# Patient Record
Sex: FEMALE | Race: WHITE | NOT HISPANIC OR LATINO | URBAN - METROPOLITAN AREA
[De-identification: names, ages, dates, MRNs, and addresses within clinical notes are randomized per-mention and may not be internally consistent; named-entity substitution may affect disease eponyms.]

---

## 2017-06-05 ENCOUNTER — EMERGENCY (EMERGENCY)
Facility: HOSPITAL | Age: 82
LOS: 1 days | Discharge: HOME | End: 2017-06-05
Attending: EMERGENCY MEDICINE | Admitting: EMERGENCY MEDICINE
Payer: MEDICARE

## 2017-06-05 VITALS
WEIGHT: 119.93 LBS | SYSTOLIC BLOOD PRESSURE: 162 MMHG | TEMPERATURE: 98 F | DIASTOLIC BLOOD PRESSURE: 83 MMHG | OXYGEN SATURATION: 96 % | HEIGHT: 60 IN | RESPIRATION RATE: 18 BRPM | HEART RATE: 82 BPM

## 2017-06-05 DIAGNOSIS — T78.3XXA ANGIONEUROTIC EDEMA, INITIAL ENCOUNTER: ICD-10-CM

## 2017-06-05 DIAGNOSIS — Z88.0 ALLERGY STATUS TO PENICILLIN: ICD-10-CM

## 2017-06-05 DIAGNOSIS — R22.0 LOCALIZED SWELLING, MASS AND LUMP, HEAD: ICD-10-CM

## 2017-06-05 DIAGNOSIS — Z88.8 ALLERGY STATUS TO OTHER DRUGS, MEDICAMENTS AND BIOLOGICAL SUBSTANCES STATUS: ICD-10-CM

## 2017-06-05 PROCEDURE — 99283 EMERGENCY DEPT VISIT LOW MDM: CPT

## 2017-06-05 NOTE — ED ADULT TRIAGE NOTE - CHIEF COMPLAINT QUOTE
walked in c/o facial and lip swelling since 8pm. Pt. reports she had fillers done at the dermatologist office today, cause a small irritation to the corner of lip and then went to have dinner and was eating spicy food and noticed the swelling. No resp. compromise. took 2 Claritin and 3 benadryl pta.

## 2017-06-05 NOTE — ED ADULT NURSE NOTE - OBJECTIVE STATEMENT
allergic reaction; had lip fillers yesterday now having reaction to injection site; upper lip inflammation and swelling; now spreading to cheek; able swallow and control secretions; speaking in complete sentences; denies SOB or difficulty breathing

## 2017-06-06 VITALS
SYSTOLIC BLOOD PRESSURE: 176 MMHG | HEART RATE: 69 BPM | TEMPERATURE: 98 F | DIASTOLIC BLOOD PRESSURE: 82 MMHG | OXYGEN SATURATION: 99 % | RESPIRATION RATE: 18 BRPM

## 2017-06-06 RX ORDER — DIPHENHYDRAMINE HCL 50 MG
50 CAPSULE ORAL ONCE
Qty: 0 | Refills: 0 | Status: COMPLETED | OUTPATIENT
Start: 2017-06-06 | End: 2017-06-06

## 2017-06-06 RX ADMIN — Medication 50 MILLIGRAM(S): at 01:50

## 2017-06-06 NOTE — ED PROVIDER NOTE - ATTENDING CONTRIBUTION TO CARE
allergic reaction with swelling of R upper and lower lips, no airway compromise, no distress, improving after benadryl and observation, unable to take steroids, wants to go home, reliable, will discharge with return precautions, instructions to continue PO benadryl, and follow up with PMD tomorrow

## 2017-06-06 NOTE — ED PROVIDER NOTE - OBJECTIVE STATEMENT
PMHx liver mass on gleevex presents with swelling over the R corner of the mouth that started at 7:30p after eating scallops with chilli peppers. swelling started to spreat extends to the R side of the lip and cheek. she took two claritin and 3 benadryl 25mg prior to arrival. denies wheezing, chest pain, throat swelling, tongue swell, rash. denies history of similar symptoms. patient states that she had resalin injects around the mouth and to the area where the swelling developed at 4:30p this afternoon. patient has had injections before without ill effect

## 2017-06-06 NOTE — ED ADULT NURSE REASSESSMENT NOTE - NS ED NURSE REASSESS COMMENT FT1
swelling to upper lip staying the same; no worsening of inflammation; still speaking in complete sentences; no difficulty breathing

## 2017-06-06 NOTE — ED PROVIDER NOTE - PROGRESS NOTE DETAILS
patient offered steroids and IV benadryl but is refusing. states that she was told to avoid steroids by her oncologist. patient with continued improvement of edema. CTA BL, airway patient

## 2017-06-06 NOTE — ED PROVIDER NOTE - MEDICAL DECISION MAKING DETAILS
patient with angioedema after eating scallops with chilli. feeling better after PO benadryl. advised continued use of benadryl and follow up with PCP and allergist for testing.

## 2017-06-28 ENCOUNTER — EMERGENCY (EMERGENCY)
Facility: HOSPITAL | Age: 82
LOS: 1 days | Discharge: HOME | End: 2017-06-28
Attending: EMERGENCY MEDICINE | Admitting: EMERGENCY MEDICINE
Payer: MEDICARE

## 2017-06-28 VITALS
HEART RATE: 82 BPM | DIASTOLIC BLOOD PRESSURE: 77 MMHG | RESPIRATION RATE: 18 BRPM | TEMPERATURE: 98 F | SYSTOLIC BLOOD PRESSURE: 135 MMHG | OXYGEN SATURATION: 98 %

## 2017-06-28 DIAGNOSIS — S92.502D: ICD-10-CM

## 2017-06-28 DIAGNOSIS — Z88.0 ALLERGY STATUS TO PENICILLIN: ICD-10-CM

## 2017-06-28 DIAGNOSIS — Z88.8 ALLERGY STATUS TO OTHER DRUGS, MEDICAMENTS AND BIOLOGICAL SUBSTANCES STATUS: ICD-10-CM

## 2017-06-28 PROCEDURE — 99282 EMERGENCY DEPT VISIT SF MDM: CPT

## 2017-06-28 NOTE — ED ADULT NURSE NOTE - CHPI ED SYMPTOMS NEG
no numbness/no weakness/no bruising/no back pain/no difficulty bearing weight/no deformity/no tingling/no fever/no abrasion/no stiffness

## 2017-06-28 NOTE — ED PROVIDER NOTE - MUSCULOSKELETAL, MLM
Some tenderness to palpation and mild swelling to the left 3rd toe Some tenderness to palpation and mild swelling to the left 3rd toe at PIP

## 2017-06-28 NOTE — ED PROVIDER NOTE - MEDICAL DECISION MAKING DETAILS
Likely fx of left 3rd toe, pt does not want xray and I agree and is not indicated and will . Instructed buddy tape, hard toe shoe, F/U with PMD, and NSAID as needed.

## 2017-06-28 NOTE — ED PROVIDER NOTE - OBJECTIVE STATEMENT
85 y/o Female presents to the ED from PCP for left 3rd toe evaluation. Pt states she stubbed her toe on the foot of her bed 2 months ago, but her PCP sent her to the ED for images. Pt endorses some pain and swelling but is able to bear weight with normal gait.

## 2017-06-28 NOTE — ED ADULT NURSE NOTE - OBJECTIVE STATEMENT
Pt sent in by PMD for xray of left third toe after stumping it [id number] ago. Pt reports pain at site, some edema present, no bruising or deformities noted. Pt denies any numbness or tingling. Pt does not having difficulty bearing weight, can ambulate.

## 2019-02-25 ENCOUNTER — EMERGENCY (EMERGENCY)
Facility: HOSPITAL | Age: 84
LOS: 1 days | Discharge: HOME | End: 2019-02-25
Admitting: EMERGENCY MEDICINE
Payer: MEDICARE

## 2019-02-25 VITALS
WEIGHT: 106.92 LBS | SYSTOLIC BLOOD PRESSURE: 137 MMHG | RESPIRATION RATE: 18 BRPM | DIASTOLIC BLOOD PRESSURE: 78 MMHG | HEART RATE: 87 BPM | OXYGEN SATURATION: 95 % | TEMPERATURE: 98 F

## 2019-02-25 DIAGNOSIS — M79.671 PAIN IN RIGHT FOOT: ICD-10-CM
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
## 2019-02-25 DIAGNOSIS — Z88.0 ALLERGY STATUS TO PENICILLIN: ICD-10-CM

## 2019-02-25 DIAGNOSIS — Z88.8 ALLERGY STATUS TO OTHER DRUGS, MEDICAMENTS AND BIOLOGICAL SUBSTANCES: ICD-10-CM

## 2019-02-25 DIAGNOSIS — L03.031 CELLULITIS OF RIGHT TOE: ICD-10-CM

## 2019-02-25 DIAGNOSIS — S90.851A SUPERFICIAL FOREIGN BODY, RIGHT FOOT, INITIAL ENCOUNTER: ICD-10-CM
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
## 2019-02-25 PROCEDURE — 99283 EMERGENCY DEPT VISIT LOW MDM: CPT | Mod: 25

## 2019-02-25 PROCEDURE — 28190 REMOVAL OF FOOT FOREIGN BODY: CPT

## 2019-02-25 NOTE — ED ADULT TRIAGE NOTE - CHIEF COMPLAINT QUOTE
c/o right foot pain x 2 days. as per pt, does not know if she has a cut or splinter after stepping on wooden floor.

## 2019-02-25 NOTE — ED PROCEDURE NOTE - NS ED ATTENDING NAME FT
ling
Patient has abnormal UA, and has history of chronic UTIs, with evidence of bladder wall thickening on CT which may represent cystitis.   Patient takes nitrofurantoin 50mg PO qd at home for prophylaxis.  Will hold nitrofurantoin for now while patient is receiving zosyn.

## 2019-02-25 NOTE — ED PROVIDER NOTE - CARE PLAN
Principal Discharge DX:	Splinter in skin  Secondary Diagnosis:	Cellulitis of toe, unspecified laterality

## 2019-02-25 NOTE — ED PROVIDER NOTE - OBJECTIVE STATEMENT
88 y/o female with no pertinent PMHx, allergic to Celebrex and penicillin, presents to ED with c/o right foot pain x2 days. Pt states she thinks she may have a splinter after walking on a wooden floor. She denies weakness, no numbness/tingling, no other injury or complaints. 88 y/o female with no pertinent PMHx, allergic to Celebrex and penicillin, presents to ED with c/o right foot pain x2 days. Pt states she thinks she may have a splinter after walking on a wooden floor. She denies weakness, no numbness/tingling, fever, chills, no other injury or complaints.

## 2019-02-25 NOTE — ED PROVIDER NOTE - CLINICAL SUMMARY MEDICAL DECISION MAKING FREE TEXT BOX
86 y/o female presents with splinter to her right foot. Area numbed and splinter was removed with a small amount of purulent drainage. Will send abx and advised for follow-up with her PMD.

## 2019-02-25 NOTE — ED PROVIDER NOTE - NSFOLLOWUPINSTRUCTIONS_ED_ALL_ED_FT
TAKE ANTIBIOTICS AS PRESCRIBED.     KEEP FOOT CLEAN AND BANDAGED.     KEEP IT ELEVATED WHENEVER POSSIBLE TO REDUCE INFECTION.     RETURN TO ER FOR ANY NEW OR CONCERNING SYMPTOMS INCLUDING INCREASED PAIN, FEVER, CHILLS, ANY OTHER CONCERNS.

## 2019-02-26 PROBLEM — C22.7: Chronic | Status: ACTIVE | Noted: 2017-06-05

## 2019-03-11 ENCOUNTER — EMERGENCY (EMERGENCY)
Facility: HOSPITAL | Age: 84
LOS: 1 days | Discharge: HOME | End: 2019-03-11
Attending: EMERGENCY MEDICINE | Admitting: EMERGENCY MEDICINE
Payer: MEDICARE

## 2019-03-11 VITALS
TEMPERATURE: 98 F | RESPIRATION RATE: 16 BRPM | OXYGEN SATURATION: 97 % | HEART RATE: 95 BPM | DIASTOLIC BLOOD PRESSURE: 71 MMHG | SYSTOLIC BLOOD PRESSURE: 143 MMHG | WEIGHT: 106.92 LBS

## 2019-03-11 DIAGNOSIS — J18.9 PNEUMONIA, UNSPECIFIED ORGANISM: ICD-10-CM

## 2019-03-11 DIAGNOSIS — R05 COUGH: ICD-10-CM

## 2019-03-11 DIAGNOSIS — Z88.8 ALLERGY STATUS TO OTHER DRUGS, MEDICAMENTS AND BIOLOGICAL SUBSTANCES: ICD-10-CM

## 2019-03-11 DIAGNOSIS — Z79.2 LONG TERM (CURRENT) USE OF ANTIBIOTICS: ICD-10-CM

## 2019-03-11 DIAGNOSIS — Z88.0 ALLERGY STATUS TO PENICILLIN: ICD-10-CM

## 2019-03-11 DIAGNOSIS — Z79.52 LONG TERM (CURRENT) USE OF SYSTEMIC STEROIDS: ICD-10-CM

## 2019-03-11 DIAGNOSIS — Z79.899 OTHER LONG TERM (CURRENT) DRUG THERAPY: ICD-10-CM

## 2019-03-11 LAB
ALBUMIN SERPL ELPH-MCNC: 2.9 G/DL — LOW (ref 3.4–5)
ALP SERPL-CCNC: 124 U/L — HIGH (ref 40–120)
ALT FLD-CCNC: 19 U/L — SIGNIFICANT CHANGE UP (ref 12–42)
ANION GAP SERPL CALC-SCNC: 8 MMOL/L — LOW (ref 9–16)
AST SERPL-CCNC: 21 U/L — SIGNIFICANT CHANGE UP (ref 15–37)
BASOPHILS NFR BLD AUTO: 0.3 % — SIGNIFICANT CHANGE UP (ref 0–2)
BILIRUB SERPL-MCNC: 0.2 MG/DL — SIGNIFICANT CHANGE UP (ref 0.2–1.2)
BUN SERPL-MCNC: 11 MG/DL — SIGNIFICANT CHANGE UP (ref 7–23)
CALCIUM SERPL-MCNC: 9.5 MG/DL — SIGNIFICANT CHANGE UP (ref 8.5–10.5)
CHLORIDE SERPL-SCNC: 97 MMOL/L — SIGNIFICANT CHANGE UP (ref 96–108)
CO2 SERPL-SCNC: 27 MMOL/L — SIGNIFICANT CHANGE UP (ref 22–31)
CREAT SERPL-MCNC: 0.7 MG/DL — SIGNIFICANT CHANGE UP (ref 0.5–1.3)
EOSINOPHIL NFR BLD AUTO: 0.6 % — SIGNIFICANT CHANGE UP (ref 0–6)
FLU A RESULT: SIGNIFICANT CHANGE UP
FLU A RESULT: SIGNIFICANT CHANGE UP
FLUAV AG NPH QL: SIGNIFICANT CHANGE UP
FLUBV AG NPH QL: SIGNIFICANT CHANGE UP
GLUCOSE SERPL-MCNC: 113 MG/DL — HIGH (ref 70–99)
HCT VFR BLD CALC: 28.4 % — LOW (ref 34.5–45)
HGB BLD-MCNC: 9.7 G/DL — LOW (ref 11.5–15.5)
IMM GRANULOCYTES NFR BLD AUTO: 0.4 % — SIGNIFICANT CHANGE UP (ref 0–1.5)
LACTATE SERPL-SCNC: 0.8 MMOL/L — SIGNIFICANT CHANGE UP (ref 0.4–2)
LYMPHOCYTES # BLD AUTO: 11.1 % — LOW (ref 13–44)
MAGNESIUM SERPL-MCNC: 1.9 MG/DL — SIGNIFICANT CHANGE UP (ref 1.6–2.6)
MCHC RBC-ENTMCNC: 32 PG — SIGNIFICANT CHANGE UP (ref 27–34)
MCHC RBC-ENTMCNC: 34.2 G/DL — SIGNIFICANT CHANGE UP (ref 32–36)
MCV RBC AUTO: 93.7 FL — SIGNIFICANT CHANGE UP (ref 80–100)
MONOCYTES NFR BLD AUTO: 10.3 % — SIGNIFICANT CHANGE UP (ref 2–14)
NEUTROPHILS NFR BLD AUTO: 77.3 % — HIGH (ref 43–77)
PLATELET # BLD AUTO: 400 K/UL — SIGNIFICANT CHANGE UP (ref 150–400)
POTASSIUM SERPL-MCNC: 4.2 MMOL/L — SIGNIFICANT CHANGE UP (ref 3.5–5.3)
POTASSIUM SERPL-SCNC: 4.2 MMOL/L — SIGNIFICANT CHANGE UP (ref 3.5–5.3)
PROT SERPL-MCNC: 6.8 G/DL — SIGNIFICANT CHANGE UP (ref 6.4–8.2)
RBC # BLD: 3.03 M/UL — LOW (ref 3.8–5.2)
RBC # FLD: 13.2 % — SIGNIFICANT CHANGE UP (ref 10.3–14.5)
RSV RESULT: SIGNIFICANT CHANGE UP
RSV RNA RESP QL NAA+PROBE: SIGNIFICANT CHANGE UP
SODIUM SERPL-SCNC: 132 MMOL/L — SIGNIFICANT CHANGE UP (ref 132–145)
WBC # BLD: 12.2 K/UL — HIGH (ref 3.8–10.5)
WBC # FLD AUTO: 12.2 K/UL — HIGH (ref 3.8–10.5)

## 2019-03-11 PROCEDURE — 71046 X-RAY EXAM CHEST 2 VIEWS: CPT | Mod: 26

## 2019-03-11 PROCEDURE — 99284 EMERGENCY DEPT VISIT MOD MDM: CPT

## 2019-03-11 RX ORDER — IPRATROPIUM/ALBUTEROL SULFATE 18-103MCG
3 AEROSOL WITH ADAPTER (GRAM) INHALATION ONCE
Qty: 0 | Refills: 0 | Status: COMPLETED | OUTPATIENT
Start: 2019-03-11 | End: 2019-03-11

## 2019-03-11 RX ORDER — ALBUTEROL 90 UG/1
2 AEROSOL, METERED ORAL
Qty: 1 | Refills: 0 | OUTPATIENT
Start: 2019-03-11 | End: 2019-04-09

## 2019-03-11 RX ORDER — AZITHROMYCIN 500 MG/1
500 TABLET, FILM COATED ORAL ONCE
Qty: 0 | Refills: 0 | Status: COMPLETED | OUTPATIENT
Start: 2019-03-11 | End: 2019-03-11

## 2019-03-11 RX ORDER — ACETAMINOPHEN 500 MG
650 TABLET ORAL ONCE
Qty: 0 | Refills: 0 | Status: COMPLETED | OUTPATIENT
Start: 2019-03-11 | End: 2019-03-11

## 2019-03-11 RX ORDER — AZITHROMYCIN 500 MG/1
1 TABLET, FILM COATED ORAL
Qty: 4 | Refills: 0 | OUTPATIENT
Start: 2019-03-11 | End: 2019-03-14

## 2019-03-11 RX ADMIN — Medication 40 MILLIGRAM(S): at 19:34

## 2019-03-11 RX ADMIN — Medication 3 MILLILITER(S): at 19:34

## 2019-03-11 RX ADMIN — AZITHROMYCIN 500 MILLIGRAM(S): 500 TABLET, FILM COATED ORAL at 19:33

## 2019-03-11 RX ADMIN — Medication 3 MILLILITER(S): at 19:04

## 2019-03-11 NOTE — ED PROVIDER NOTE - PROGRESS NOTE DETAILS
Labs show mild elevation in WBCs, CXR shows small RUL PNA. Feels better after nebs. Wants to be d/c'd. Given CD with film and copies of results. Will d/c.

## 2019-03-11 NOTE — ED PROVIDER NOTE - CONSTITUTIONAL NEGATIVE STATEMENT, MLM
Pt aaox3, breathing RA freely, skin warm and dry; appears comfortable. Multiple complaints; productive cough, bilateral leg and feet pain. +edema and redness to bilateral feet extending to knees. No active cough. Ambulatory with slow steady gait. Skin macerated to soles of feet and in between toes, bilateral DP pulses equal and strong. Pt non-compliant with care; reports no follow-up of chronic conditions. Awaiting US and chest x-ray; will continue to monitor. no fever and no chills.

## 2019-03-11 NOTE — ED PROVIDER NOTE - OBJECTIVE STATEMENT
87 F presenting with cough, fever to 100.6 and cough x 3d. No hx of reactive airways. Is followed at [facility] for Liver [place]. Also has a sore throat and feels run down. No resp distress.

## 2019-03-11 NOTE — ED PROVIDER NOTE - NSFOLLOWUPINSTRUCTIONS_ED_ALL_ED_FT
Rest, stay hydrated.  Please return for worse symptoms.   Please bring copies of results to your doctors this week.

## 2019-03-11 NOTE — ED ADULT NURSE NOTE - CHPI ED NUR SYMPTOMS NEG
no shortness of breath/no fever/no edema/no headache/no wheezing/no chills/no hemoptysis/no body aches/no chest pain/no diaphoresis

## 2019-03-17 LAB
CULTURE RESULTS: SIGNIFICANT CHANGE UP
CULTURE RESULTS: SIGNIFICANT CHANGE UP
SPECIMEN SOURCE: SIGNIFICANT CHANGE UP
SPECIMEN SOURCE: SIGNIFICANT CHANGE UP

## 2019-05-02 ENCOUNTER — EMERGENCY (EMERGENCY)
Facility: HOSPITAL | Age: 84
LOS: 1 days | Discharge: HOME | End: 2019-05-02
Admitting: EMERGENCY MEDICINE
Payer: MEDICARE

## 2019-05-02 VITALS
OXYGEN SATURATION: 96 % | RESPIRATION RATE: 17 BRPM | HEART RATE: 106 BPM | SYSTOLIC BLOOD PRESSURE: 111 MMHG | TEMPERATURE: 99 F | WEIGHT: 110.01 LBS | DIASTOLIC BLOOD PRESSURE: 72 MMHG

## 2019-05-02 VITALS
HEART RATE: 79 BPM | DIASTOLIC BLOOD PRESSURE: 82 MMHG | SYSTOLIC BLOOD PRESSURE: 153 MMHG | TEMPERATURE: 98 F | RESPIRATION RATE: 18 BRPM | OXYGEN SATURATION: 96 %

## 2019-05-02 LAB
ALBUMIN SERPL ELPH-MCNC: 3.4 G/DL — SIGNIFICANT CHANGE UP (ref 3.4–5)
ALP SERPL-CCNC: 180 U/L — HIGH (ref 40–120)
ALT FLD-CCNC: 18 U/L — SIGNIFICANT CHANGE UP (ref 12–42)
ANION GAP SERPL CALC-SCNC: 4 MMOL/L — LOW (ref 9–16)
APPEARANCE UR: CLEAR — SIGNIFICANT CHANGE UP
AST SERPL-CCNC: 21 U/L — SIGNIFICANT CHANGE UP (ref 15–37)
BASOPHILS NFR BLD AUTO: 0.5 % — SIGNIFICANT CHANGE UP (ref 0–2)
BILIRUB SERPL-MCNC: 0.3 MG/DL — SIGNIFICANT CHANGE UP (ref 0.2–1.2)
BILIRUB UR-MCNC: NEGATIVE — SIGNIFICANT CHANGE UP
BUN SERPL-MCNC: 15 MG/DL — SIGNIFICANT CHANGE UP (ref 7–23)
CALCIUM SERPL-MCNC: 9.3 MG/DL — SIGNIFICANT CHANGE UP (ref 8.5–10.5)
CHLORIDE SERPL-SCNC: 100 MMOL/L — SIGNIFICANT CHANGE UP (ref 96–108)
CO2 SERPL-SCNC: 32 MMOL/L — HIGH (ref 22–31)
COLOR SPEC: YELLOW — SIGNIFICANT CHANGE UP
CREAT SERPL-MCNC: 0.85 MG/DL — SIGNIFICANT CHANGE UP (ref 0.5–1.3)
DIFF PNL FLD: ABNORMAL
EOSINOPHIL NFR BLD AUTO: 1.7 % — SIGNIFICANT CHANGE UP (ref 0–6)
GLUCOSE SERPL-MCNC: 105 MG/DL — HIGH (ref 70–99)
GLUCOSE UR QL: NEGATIVE — SIGNIFICANT CHANGE UP
HCT VFR BLD CALC: 31.4 % — LOW (ref 34.5–45)
HGB BLD-MCNC: 10.7 G/DL — LOW (ref 11.5–15.5)
IMM GRANULOCYTES NFR BLD AUTO: 0.4 % — SIGNIFICANT CHANGE UP (ref 0–1.5)
KETONES UR-MCNC: NEGATIVE — SIGNIFICANT CHANGE UP
LEUKOCYTE ESTERASE UR-ACNC: ABNORMAL
LYMPHOCYTES # BLD AUTO: 25 % — SIGNIFICANT CHANGE UP (ref 13–44)
MAGNESIUM SERPL-MCNC: 2.1 MG/DL — SIGNIFICANT CHANGE UP (ref 1.6–2.6)
MCHC RBC-ENTMCNC: 32 PG — SIGNIFICANT CHANGE UP (ref 27–34)
MCHC RBC-ENTMCNC: 34.1 G/DL — SIGNIFICANT CHANGE UP (ref 32–36)
MCV RBC AUTO: 94 FL — SIGNIFICANT CHANGE UP (ref 80–100)
MONOCYTES NFR BLD AUTO: 10.6 % — SIGNIFICANT CHANGE UP (ref 2–14)
NEUTROPHILS NFR BLD AUTO: 61.8 % — SIGNIFICANT CHANGE UP (ref 43–77)
NITRITE UR-MCNC: NEGATIVE — SIGNIFICANT CHANGE UP
PH UR: 7 — SIGNIFICANT CHANGE UP (ref 5–8)
PLATELET # BLD AUTO: 352 K/UL — SIGNIFICANT CHANGE UP (ref 150–400)
POTASSIUM SERPL-MCNC: 4.5 MMOL/L — SIGNIFICANT CHANGE UP (ref 3.5–5.3)
POTASSIUM SERPL-SCNC: 4.5 MMOL/L — SIGNIFICANT CHANGE UP (ref 3.5–5.3)
PROT SERPL-MCNC: 6.5 G/DL — SIGNIFICANT CHANGE UP (ref 6.4–8.2)
PROT UR-MCNC: NEGATIVE MG/DL — SIGNIFICANT CHANGE UP
RBC # BLD: 3.34 M/UL — LOW (ref 3.8–5.2)
RBC # FLD: 14.6 % — HIGH (ref 10.3–14.5)
SODIUM SERPL-SCNC: 136 MMOL/L — SIGNIFICANT CHANGE UP (ref 132–145)
SP GR SPEC: <=1.005 — SIGNIFICANT CHANGE UP (ref 1–1.03)
UROBILINOGEN FLD QL: 0.2 E.U./DL — SIGNIFICANT CHANGE UP
WBC # BLD: 7.5 K/UL — SIGNIFICANT CHANGE UP (ref 3.8–10.5)
WBC # FLD AUTO: 7.5 K/UL — SIGNIFICANT CHANGE UP (ref 3.8–10.5)

## 2019-05-02 PROCEDURE — 99284 EMERGENCY DEPT VISIT MOD MDM: CPT

## 2019-05-02 RX ORDER — SODIUM CHLORIDE 9 MG/ML
1000 INJECTION INTRAMUSCULAR; INTRAVENOUS; SUBCUTANEOUS ONCE
Qty: 0 | Refills: 0 | Status: DISCONTINUED | OUTPATIENT
Start: 2019-05-02 | End: 2019-05-02

## 2019-05-02 RX ORDER — SODIUM CHLORIDE 9 MG/ML
1000 INJECTION INTRAMUSCULAR; INTRAVENOUS; SUBCUTANEOUS ONCE
Qty: 0 | Refills: 0 | Status: COMPLETED | OUTPATIENT
Start: 2019-05-02 | End: 2019-05-02

## 2019-05-02 RX ADMIN — SODIUM CHLORIDE 500 MILLILITER(S): 9 INJECTION INTRAMUSCULAR; INTRAVENOUS; SUBCUTANEOUS at 17:42

## 2019-05-02 RX ADMIN — Medication 30 MILLILITER(S): at 18:04

## 2019-05-02 NOTE — ED PROVIDER NOTE - OBJECTIVE STATEMENT
88 y/o female with PMHx of liver cancer (currently undergoing chemotherapy at [facility] [place]) presents to ED for medical evaluation. Patient reports lightheadedness and fatigue, and not urinating as frequently as she normally does. Reports she feels "dehydrated". States she had similar symptoms 2 months ago, and was told she was dehydrated and received IV fluids for hydration. Denies fever, chills, nausea, vomiting, diarrhea, CP, or SOB. 88 y/o female with PMHx of liver cancer (currently undergoing chemotherapy at [facility] [place]) presents to ED for medical evaluation. Patient reports lightheadedness and fatigue, and not urinating as frequently as she normally does. Reports she feels "dehydrated". States she had similar symptoms 2 months ago, and was told she was dehydrated and received IV fluids for hydration. Denies fever, chills, nausea, vomiting, diarrhea, CP, or SOB.     Patient follows with Oncologist Dr. Hua Gregory ([phone]) at [facility] [place].

## 2019-05-02 NOTE — ED PROVIDER NOTE - CLINICAL SUMMARY MEDICAL DECISION MAKING FREE TEXT BOX
sent by pmd for IVF; pt feels "dry" no chest pain sob n/v/d.  pt on chemo currently and request IVF.  VSS well appearing

## 2019-05-06 DIAGNOSIS — Z88.8 ALLERGY STATUS TO OTHER DRUGS, MEDICAMENTS AND BIOLOGICAL SUBSTANCES: ICD-10-CM

## 2019-05-06 DIAGNOSIS — Z88.0 ALLERGY STATUS TO PENICILLIN: ICD-10-CM

## 2019-05-06 DIAGNOSIS — Z79.2 LONG TERM (CURRENT) USE OF ANTIBIOTICS: ICD-10-CM

## 2019-05-06 DIAGNOSIS — Z79.51 LONG TERM (CURRENT) USE OF INHALED STEROIDS: ICD-10-CM

## 2019-05-06 DIAGNOSIS — E86.0 DEHYDRATION: ICD-10-CM

## 2019-05-06 DIAGNOSIS — Z79.899 OTHER LONG TERM (CURRENT) DRUG THERAPY: ICD-10-CM

## 2019-05-06 DIAGNOSIS — Z79.52 LONG TERM (CURRENT) USE OF SYSTEMIC STEROIDS: ICD-10-CM

## 2019-05-06 DIAGNOSIS — R42 DIZZINESS AND GIDDINESS: ICD-10-CM

## 2023-08-25 NOTE — ED PROVIDER NOTE - SCRIBE NAME
Caller: Jessie Romero    Relationship to patient: Self    Best call back number: [phone]    Patient is needing: PATIENT CALLED IN AND WOULD LIKE A CALL BACK REGARDING [facility] REFILL. PATIENT IS SUPPOSED TO STEP UP IN DOSAGE BUT STATES SHE HAS NOT BEEN ABLE TO GET REFILL AND WOULD LIKE A CALL BACK PLEASE.      [facility] [place] [phone]      Candy Espinoza

## 2025-07-24 NOTE — ED ADULT NURSE NOTE - NSHISCREENINGQ1_ED_A_ED
Luzma Pederson stable / unchanged. Self-reported wt: 254 lbs    Treatment plan details: [id number] kcal   Is patient adhering to treatment plan: yes      Is pt eating 4-5 times each day?   B egg quesadilla OR eggs + fruit/yogurt  S: none OR [employer] granola bar  L: tuna salad + CC/yogurt OR LD wrap + turkey + lettuce + cheese OR salad + tuna  S: apple + almond butter OR granola bar OR pumpkin seeds  D: porkchop + cream of chx OR dining out (bday celebrations -  protein + brocc)   S: sf pudding     Is pt including lean protein with all meals and snacks? yes      Is pt avoiding added sugars and starchy foods? Yes - avoiding     Consuming at least 64oz of calorie free fluids? yes water    Participating in intentional exercise? Not intentional - working part time on feet for 6hrs. Discussed [employer].    Plan/Goals:   - Continue diet plan  - Focus on nonstarchy vegetables - try new recipes   - Add in exercise     Handouts: cooking resources     FAY COLE, RD  
No
Attending Discharge Physical Examination: